# Patient Record
Sex: MALE | Race: WHITE | Employment: UNEMPLOYED
[De-identification: names, ages, dates, MRNs, and addresses within clinical notes are randomized per-mention and may not be internally consistent; named-entity substitution may affect disease eponyms.]

---
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
## 2024-07-22 ENCOUNTER — HOSPITAL ENCOUNTER (OUTPATIENT)
Dept: GENERAL RADIOLOGY | Facility: CLINIC | Age: 24
Discharge: HOME | End: 2024-07-24
Payer: COMMERCIAL

## 2024-07-22 DIAGNOSIS — M54.6 ACUTE LEFT-SIDED THORACIC BACK PAIN: ICD-10-CM

## 2024-07-22 PROCEDURE — 72072 X-RAY EXAM THORAC SPINE 3VWS: CPT

## 2024-08-05 ENCOUNTER — APPOINTMENT (OUTPATIENT)
Dept: PHYSICAL THERAPY | Facility: CLINIC | Age: 24
End: 2024-08-05

## 2024-08-06 ENCOUNTER — HOSPITAL ENCOUNTER (OUTPATIENT)
Dept: PHYSICAL THERAPY | Facility: CLINIC | Age: 24
Setting detail: THERAPIES SERIES
Discharge: HOME | End: 2024-08-06
Payer: COMMERCIAL

## 2024-08-06 PROCEDURE — 97110 THERAPEUTIC EXERCISES: CPT

## 2024-08-06 PROCEDURE — 97140 MANUAL THERAPY 1/> REGIONS: CPT

## 2024-08-06 PROCEDURE — 97161 PT EVAL LOW COMPLEX 20 MIN: CPT

## 2024-08-06 NOTE — CONSULTS
[facility]  Outpatient Rehabilitation &  Therapy  [address]   P: [phone]  F: [phone]      Physical Therapy Spine Evaluation    Date:  2024  Patient: Edwin Lazaro  : 2000  MRN: [id number]  Physician: Dr Alfonso Garcia   Insurance: BCBS (Medical Necessity)  Medical Diagnosis:   M54.6 (ICD-10-CM) - Acute left-sided thoracic back pain   M53.3, G89.29 (ICD-10-CM) - Chronic left SI joint pain   Rehab Codes: M62.81 (Muscle Weakness), M62.9 (Disorder of Muscle), M79.1 (Myalgia), Z73.6   Onset Date:   Next 's appt.: -      Subjective:   CC/HPI: Pt is a 24 year old male with left side mid and lower back pain. Patient has been having pain since 2024, went to see PCP. No radicular symptoms. XR (-). Pain is worse with sitting.     He is seeing a chiropractor on/off for the last 3 years. He most recently went in 2024, re-alignment with no help.      Playing soccer         PMHx: Osgood-Schlatter's knees               [x] Refer to full medical chart  In [facility]         Radiology: Results:     [x] X-RAY  (-)    [] MRI    [] Other         Fall Risk:      [x] None   [] PRESENT/See Chirinos Scale   Medications: [x] Refer to full medical record [] None [] Other:  Allergies:      [x] Refer to full medical record [] None [] Other:        ADL/IADL [x] Previously independent with all [x] Currently independent with all Who currently assists the patient with task     [] Previously independent with all except: [] Currently independent with all except:     Bathing  [] Assist [] Assist     Dress/grooming [] Assist [] Assist     Transfer/mobility [] Assist [] Assist     Feeding [] Assist [] Assist     Toileting [] Assist [] Assist     Driving [] Assist [] Assist     Housekeeping [] Assist [] Assist     Grocery shop/meal prep [] Assist [] Assist          Em[place] [employer]    Job status     Work Activities/duties  -   Recreational Activities Soccer, gym          Pain

## 2024-08-08 ENCOUNTER — HOSPITAL ENCOUNTER (OUTPATIENT)
Dept: PHYSICAL THERAPY | Facility: CLINIC | Age: 24
Setting detail: THERAPIES SERIES
Discharge: HOME | End: 2024-08-08
Payer: COMMERCIAL

## 2024-08-08 PROCEDURE — 97110 THERAPEUTIC EXERCISES: CPT

## 2024-08-08 PROCEDURE — 97140 MANUAL THERAPY 1/> REGIONS: CPT

## 2024-08-08 NOTE — FLOWSHEET NOTE
[] Manipulation [x] Supine  [] Prone  [] Seated  [] Beginning  [x] Middle  [] End-point    Lumbar []  []    [] MET   [] MOB   [] Manipulation [] Supine  [] Prone  [] Seated  [] Beginning  [] Middle  [] End-point    SI    []  []   [] MET   [] MOB   [] Manipulation [] Supine  [] Prone  [] Seated  [] Beginning  [] Middle  [] End-point    FRS = flexed, rotated, side-bent  ERS = Extended, rotated, side-bent     NS = Neutral Spine MET = Muscle Energy Technique  MOB = Mobilization   Manip = HVLA Manipulation             Myofascial Restrictions  Location  R/L Treatment  Tools Notes   Lower Crossed     [x] Psoas   [x] Iliacus [x] Right  [x] Left [x] Direct  [] Indirect [x] Hands-On  [] IASTM  [] Hypervolt       [] Piriformis [] Right  [] Left [] Direct  [] Indirect [] Hands-On  [] IASTM  [] Hypervolt       [] Coccygeus  [] Levator Ani    [] Right  [] Left [] Direct  [] Indirect [] Hands-On  [] IASTM  [] Hypervolt       [x] Hamstring [x] Right  [x] Left [x] Direct  [] Indirect [x] Hands-On  [] IASTM  [] Hypervolt 50# cast iron roller     [] Quad [] Right  [] Left [] Direct  [] Indirect [] Hands-On  [] IASTM  [] Hypervolt       [] Gastrocnemius [] Right  [] Left [] Direct  [] Indirect [] Hands-On  [] IASTM  [] Hypervolt       [] Soleus [] Right  [] Left [] Direct  [] Indirect [] Hands-On  [] IASTM  [] Hypervolt       [] Other [] Right  [] Left [] Direct  [] Indirect [] Hands-On  [] IASTM  [] Hypervolt                         Direct or Indirect release  IASTM= Instrument assisted soft tissue mobilization        Specific Instructions for next treatment: manual, ROM      Treatment Charges: Mins Units   []  Modalities     [x]  Ther Exercise 30 2   [x]  Manual Therapy 10 1   []  Ther Activities     []  Neuro Re-ed     []  Vasocompression     [] Gait     []  Other     Total Billable time 40 3       Assessment: [x] Progressing toward goals. Discussed proper sleeping positions with good understanding. Updated HEP with strength

## 2024-08-12 ENCOUNTER — HOSPITAL ENCOUNTER (OUTPATIENT)
Dept: PHYSICAL THERAPY | Facility: CLINIC | Age: 24
Discharge: HOME | End: 2024-08-12

## 2024-08-12 PROCEDURE — 9900000073 HC MANUAL THERAPY PER 15 MIN (SELF-PAY)

## 2024-08-12 PROCEDURE — 9900000067 HC THERAPEUTIC EXERCISE EA 15 MINS (SELF-PAY)

## 2024-08-15 ENCOUNTER — HOSPITAL ENCOUNTER (OUTPATIENT)
Dept: PHYSICAL THERAPY | Facility: CLINIC | Age: 24
Setting detail: THERAPIES SERIES
Discharge: HOME | End: 2024-08-15

## 2024-08-15 NOTE — FLOWSHEET NOTE
[] [facility]  Outpatient Rehabilitation &  Therapy  [address]  [phone]  [phone] [] [facility]  Outpatient Rehabilitation &  Therapy  [address]  P: [phone]  F: [phone] [] [facility]  Outpatient Rehabilitation &  Therapy  [address]  P: [phone]  F: [phone] [] [facility]  Outpatient Rehabilitation &  Therapy  [address]  [phone]  [phone] [x] [facility]  Outpatient Rehabilitation &  Therapy  [address]   P: [phone]  F: [phone]  [] [facility]  Outpatient Rehabilitation &  Therapy  [address]  P: [phone]  F: [phone] [] [facility]  Outpatient Rehabilitation &  Therapy  [address]  P: [phone]  F: [phone] [] [facility]  Outpatient Rehabilitation &  Therapy  [address]  P: [phone]  F: [phone] [] [facility]  Outpatient Rehabilitation &  Therapy  [address]  P: [phone]  F: [phone]  [] [facility] Outpatient Rehabilitation &  Therapy  [address]  P: [phone]  F: [phone]     Therapy Cancel/No Show note    Date: 8/15/2024  Patient: Edwin Lazaro  : 2000  MRN: [id number]    Cancels/No Shows to date: 1    For today's appointment patient:    [x]  Cancelled    [] Rescheduled appointment    [] No-show     Reason given by patient:    []  Patient ill    []  Conflicting appointment    [x] No transportation      [] Conflict with work    [] No reason given    [] Weather related    [] COVID-19    [] Other:      Comments:        [x] Next appointment was confirmed    Electronically signed by: TITUS JOE PTA

## 2024-08-19 ENCOUNTER — HOSPITAL ENCOUNTER (OUTPATIENT)
Dept: PHYSICAL THERAPY | Facility: CLINIC | Age: 24
Setting detail: THERAPIES SERIES
Discharge: HOME | End: 2024-08-19

## 2024-08-19 PROCEDURE — 9900000067 HC THERAPEUTIC EXERCISE EA 15 MINS (SELF-PAY)

## 2024-08-19 NOTE — FLOWSHEET NOTE
reps  - Swiss Ball Walkout  - 1 x daily - 7 x weekly - 3 sets - 10 reps  - Plank with Feet on Swiss Ball  - 1 x daily - 7 x weekly - 3 sets - 10 reps  - Quadruped Alternating Leg Extensions  - 1 x daily - 7 x weekly - 3 sets - 10 reps  - Quadruped Fire Hydrant  - 1 x daily - 7 x weekly - 3 sets - 10 reps    Plan: [x] Continue current frequency toward long and short term goals.          Time In: 1400        Time Out: 1440    Electronically signed by:  Rodrigue Doty PT

## 2024-08-22 ENCOUNTER — HOSPITAL ENCOUNTER (OUTPATIENT)
Dept: PHYSICAL THERAPY | Facility: CLINIC | Age: 24
Setting detail: THERAPIES SERIES
Discharge: HOME | End: 2024-08-22

## 2024-08-22 PROCEDURE — 9900000067 HC THERAPEUTIC EXERCISE EA 15 MINS (SELF-PAY)

## 2024-08-22 NOTE — FLOWSHEET NOTE
[x] [facility]  Outpatient Rehabilitation &  Therapy  [address]   P: [phone]  F: [phone]      Physical Therapy Daily Treatment Note    Date:  2024  Patient Name:  Edwin Lazaro    :  2000  MRN: [id number]  Insurance: self-pay retail  Medical Diagnosis:   M54.6 (ICD-10-CM) - Acute left-sided thoracic back pain   M53.3, G89.29 (ICD-10-CM) - Chronic left SI joint pain   Rehab Codes: M62.81 (Muscle Weakness), M62.9 (Disorder of Muscle), M79.1 (Myalgia), Z73.6   Onset Date:                Next 's appt.: -  Visit# / total visits:      Cancels/No Shows: 0/0      Subjective:    Pain:  [] Yes  [x] No Location: low back Pain Rating: (0-10 scale) 0/10  Pain altered Tx:  [x] No  [] Yes  Action:  Comments: Patient arrived stating no pain today.         Objective:  Today’s Treatment:  Precautions: Standard   Exercise     LBP Reps/ Time Weight/ Level Comments             Bike  10'                 1/2 kneel Hip Flexor Stretch  1'ea   HEP   Piriformis stretch   3x30\"   HEP             Sidelying Hip Abd  HEP Blue    Clamshells HEP Blue    PBall bridges  x20     Quadruped hip extension   x10       Quadruped hip abd  x10           SB gastroc stretch   3x30\"      HS stool stretch   3x30\"       Monster walks lateral  2L Purple      Monster walks forward  2L Purple      Hip circles  x10 Purple     TRX SL squats   2x10     Balance board  L2x5'       PBall supine walkouts  2x10       PBall prone walkouts  2x10                                            Myofascial Restrictions  Location  R/L Treatment  Tools Notes   Lower Crossed     [] Psoas   [] Iliacus [] Right  [] Left [] Direct  [] Indirect [] Hands-On  [] IASTM  [] Hypervolt       [] Piriformis [] Right  [] Left [] Direct  [] Indirect [] Hands-On  [] IASTM  [] Hypervolt       [] Coccygeus  [] Levator Ani    [] Right  [] Left [] Direct  [] Indirect [] Hands-On  [] IASTM  [] Hypervolt       [] Hamstring [] Right  []

## 2024-10-01 ENCOUNTER — HOSPITAL ENCOUNTER (OUTPATIENT)
Dept: PHYSICAL THERAPY | Facility: CLINIC | Age: 24
Discharge: HOME | End: 2024-10-01

## 2024-10-01 PROCEDURE — 9900000067 HC THERAPEUTIC EXERCISE EA 15 MINS (SELF-PAY)

## 2024-10-01 NOTE — FLOWSHEET NOTE
1. Improve score on assessment tool Oswestry from 12% impairment to less than 4% impairment  []  []  []      2. Reduce pain levels to 1/10 or less with ADLs []  []  []      3. Eliminate somatic dysfunctions  []  []  []                        Patient goals: decrease pain      Rehab Potential:  [x] Good  [] Fair  [] Poor    Suggested Professional Referral:  [x] No  [] Yes:  Barriers to Goal Achievement::  [x] No  [] Yes:  Domestic Concerns:  [x] No  [] Yes:        Education        Yes No     Patient Education Provided today  [x]  []      Reviewed established HEP  [x]  []               Comprehension of Education:         Verbalizes Understanding  [x]  []      Demonstrates understanding [x]  []      Needs review [x]  []      Demonstrates/verbalizes HEP/  Education previously given [x]  []                Specific education given  [x]  []                   [employer] Program for HEP Given [x]  []  [email or web address]     [employer] updated as per exercise log today  [x]  []                     Access Code: [email or web address]  URL: [email or web address]  Date: 08/19/2024  Prepared by: Rodrigue Doty    Exercises  - Half Kneeling Hip Flexor Stretch  - 1 x daily - 7 x weekly - 3 sets - 30 (sec) hold  - Hooklying Hamstring Stretch with Strap  - 1 x daily - 7 x weekly - 3 sets - 30 (sec) hold  - Long Sitting Calf Stretch with Strap  - 1 x daily - 7 x weekly - 3 sets - 30 (sec) hold  - Piriformis Mobilization with Small Ball  - 1 x daily - 7 x weekly - 3 sets - 10 reps  - Piriformis Mobilization on Foam Roll  - 1 x daily - 7 x weekly - 3 sets - 10 reps  - Supine Piriformis Stretch  - 1 x daily - 7 x weekly - 3 sets - 30 second hold  - Clamshell with Resistance  - 1 x daily - 7 x weekly - 2 sets - 10 reps  - Sidelying Hip Abduction with Resistance at Thighs  - 1 x daily - 7 x weekly - 2 sets - 10 reps  - Modified Asim Stretch  - 1 x daily - 7 x weekly - 1 sets - 1min hold  - Side Stepping with Resistance at Ankles  - 1 x daily - 7 x weekly